# Patient Record
Sex: FEMALE | Employment: UNEMPLOYED | URBAN - METROPOLITAN AREA
[De-identification: names, ages, dates, MRNs, and addresses within clinical notes are randomized per-mention and may not be internally consistent; named-entity substitution may affect disease eponyms.]

---

## 2019-02-20 ENCOUNTER — OFFICE VISIT (OUTPATIENT)
Dept: OPTOMETRY | Facility: CLINIC | Age: 34
End: 2019-02-20
Payer: COMMERCIAL

## 2019-02-20 DIAGNOSIS — H52.13 MYOPIA OF BOTH EYES: ICD-10-CM

## 2019-02-20 DIAGNOSIS — Z01.00 ENCOUNTER FOR EXAMINATION OF EYES AND VISION WITHOUT ABNORMAL FINDINGS: Primary | ICD-10-CM

## 2019-02-20 DIAGNOSIS — Z46.0 CONTACT LENS/GLASSES FITTING: ICD-10-CM

## 2019-02-20 PROCEDURE — 92310 CONTACT LENS FITTING OU: CPT | Mod: GA | Performed by: OPTOMETRIST

## 2019-02-20 PROCEDURE — 92004 COMPRE OPH EXAM NEW PT 1/>: CPT | Performed by: OPTOMETRIST

## 2019-02-20 PROCEDURE — 92015 DETERMINE REFRACTIVE STATE: CPT | Performed by: OPTOMETRIST

## 2019-02-20 ASSESSMENT — REFRACTION_MANIFEST
OD_SPHERE: -1.50
OD_CYLINDER: SPHERE
OS_CYLINDER: SPHERE
OS_SPHERE: -1.75

## 2019-02-20 ASSESSMENT — VISUAL ACUITY
OD_CC+: -1
OS_CC: 20/25
METHOD: SNELLEN - LINEAR
OS_CC: 20/30 -1
CORRECTION_TYPE: CONTACTS
OD_CC: 20/40
OD_CC: 20/20

## 2019-02-20 ASSESSMENT — TONOMETRY
IOP_METHOD: APPLANATION
OS_IOP_MMHG: 21
OD_IOP_MMHG: 21

## 2019-02-20 ASSESSMENT — REFRACTION_CURRENTRX
OS_CYLINDER: SPHERE
OD_SPHERE: -1.50
OD_CYLINDER: SPHERE
OS_BRAND: COOPER BIOFINITY BC 8.6, D 14.0
OS_SPHERE: -1.75
OD_BRAND: COOPER BIOFINITY BC 8.6, D 14.0

## 2019-02-20 ASSESSMENT — EXTERNAL EXAM - RIGHT EYE: OD_EXAM: NORMAL

## 2019-02-20 ASSESSMENT — SLIT LAMP EXAM - LIDS
COMMENTS: NORMAL
COMMENTS: NORMAL

## 2019-02-20 ASSESSMENT — CUP TO DISC RATIO
OD_RATIO: 0.15
OS_RATIO: 0.15

## 2019-02-20 ASSESSMENT — CONF VISUAL FIELD
OD_NORMAL: 1
OS_NORMAL: 1

## 2019-02-20 ASSESSMENT — EXTERNAL EXAM - LEFT EYE: OS_EXAM: NORMAL

## 2019-02-20 NOTE — LETTER
2/20/2019         RE: Matthew Enciso  [address] [facility] [address] [place] [id number]        Dear Colleague,    Thank you for referring your patient, Matthew Enciso, to the [facility]. Please see a copy of my visit note below.    Chief Complaint   Patient presents with     Annual Eye Exam    Previous contact lens wearer? Yes  Comfort of contact lenses : not happy with the comfort, patient currently wears AV Oasys  Satisfied with current lenses: No    Accompanied by self  Last Eye Exam: 1 year ago  Dilated Previously: Yes    What are you currently using to see?  Contacts, patient doesn't have eyeglasses       Distance Vision Acuity: Satisfied with vision    Near Vision Acuity: Satisfied with vision while reading  unaided    Eye Comfort: good  Do you use eye drops? : No  Occupation or Hobbies: homemaker    Ct Lisa, Optometric Tech          Medical, surgical and family histories reviewed and updated 2/20/2019.       OBJECTIVE: See Ophthalmology exam    ASSESSMENT:    ICD-10-CM    1. Encounter for examination of eyes and vision without abnormal findings Z01.00 EYE EXAM (SIMPLE-NONBILLABLE)   2. Myopia of both eyes H52.13 EYE EXAM (SIMPLE-NONBILLABLE)     REFRACTION   3. Contact lens/glasses fitting Z46.0 EYE EXAM (SIMPLE-NONBILLABLE)     REFRACTION      PLAN:     Patient Instructions   Matthew was advised of today's exam findings.  Fill glasses prescription. Copy of glasses Rx provided today.    Patient is current wearer of Acuvue Oasys contact lenses, but reports comfort issues. Has previously worn Biofinity lenses and Air Optix, had issues with comfort of both lenses but Biofinity was better than Air Optix.     Begin trial of Biofinity contact lenses.  Do not sleep in contact lenses. Health risks discussed.  Do not swim in contact lenses . Health risks discussed.  Only wear contact lenses if they are comfortable and eyes are not red .  Use new contact solution in case every day.  Maximum contact  wearing time of 12 hours per day.    Return in  3-4 weeks for contact lens follow-up. Come to appointment wearing the contact lenses.     The affects of the dilating drops last for 4- 6 hours.  You will be more sensitive to light and vision will be blurry up close.  Mydriatic sunglasses were given if needed.      Kevin Lutz O.D.  [address]  [place]  [address]    [phone]           Again, thank you for allowing me to participate in the care of your patient.        Sincerely,        Kevin Lutz OD

## 2019-02-20 NOTE — PATIENT INSTRUCTIONS
Matthew was advised of today's exam findings.  Fill glasses prescription. Copy of glasses Rx provided today.    Patient is current wearer of Acuvue Oasys contact lenses, but reports comfort issues. Has previously worn Biofinity lenses and Air Optix, had issues with comfort of both lenses but Biofinity was better than Air Optix.     Begin trial of Biofinity contact lenses.  Do not sleep in contact lenses. Health risks discussed.  Do not swim in contact lenses . Health risks discussed.  Only wear contact lenses if they are comfortable and eyes are not red .  Use new contact solution in case every day.  Maximum contact wearing time of 12 hours per day.    Return in  3-4 weeks for contact lens follow-up. Come to appointment wearing the contact lenses.     The affects of the dilating drops last for 4- 6 hours.  You will be more sensitive to light and vision will be blurry up close.  Mydriatic sunglasses were given if needed.      Kevin Lutz O.D.  [facility]  [address] [place] [address]. [place]  [place]  [address]    [phone]

## 2019-02-20 NOTE — PROGRESS NOTES
Chief Complaint   Patient presents with     Annual Eye Exam    Previous contact lens wearer? Yes  Comfort of contact lenses : not happy with the comfort, patient currently wears AV Oasys  Satisfied with current lenses: No    Accompanied by self  Last Eye Exam: 1 year ago  Dilated Previously: Yes    What are you currently using to see?  Contacts, patient doesn't have eyeglasses       Distance Vision Acuity: Satisfied with vision    Near Vision Acuity: Satisfied with vision while reading  unaided    Eye Comfort: good  Do you use eye drops? : No  Occupation or Hobbies: homemaker    Ct Lisa, [employer]          Medical, surgical and family histories reviewed and updated 2/20/2019.       OBJECTIVE: See Ophthalmology exam    ASSESSMENT:    ICD-10-CM    1. Encounter for examination of eyes and vision without abnormal findings Z01.00 EYE EXAM (SIMPLE-NONBILLABLE)   2. Myopia of both eyes H52.13 EYE EXAM (SIMPLE-NONBILLABLE)     REFRACTION   3. Contact lens/glasses fitting Z46.0 EYE EXAM (SIMPLE-NONBILLABLE)     REFRACTION      PLAN:     Patient Instructions   [place] was advised of today's exam findings.  Fill glasses prescription. Copy of glasses Rx provided today.    Patient is current wearer of Acuvue Oasys contact lenses, but reports comfort issues. Has previously worn Biofinity lenses and Air Optix, had issues with comfort of both lenses but Biofinity was better than Air Optix.     Begin trial of Biofinity contact lenses.  Do not sleep in contact lenses. Health risks discussed.  Do not swim in contact lenses . Health risks discussed.  Only wear contact lenses if they are comfortable and eyes are not red .  Use new contact solution in case every day.  Maximum contact wearing time of 12 hours per day.    Return in  3-4 weeks for contact lens follow-up. Come to appointment wearing the contact lenses.     The affects of the dilating drops last for 4- 6 hours.  You will be more sensitive to light and vision will  be blurry up close.  Mydriatic sunglasses were given if needed.      Kevin Lutz O.D.  [address]  [place]  [phone]

## 2019-03-21 ENCOUNTER — OFFICE VISIT (OUTPATIENT)
Dept: OPTOMETRY | Facility: CLINIC | Age: 34
End: 2019-03-21

## 2019-03-21 DIAGNOSIS — Z46.0 CONTACT LENS/GLASSES FITTING: Primary | ICD-10-CM

## 2019-03-21 DIAGNOSIS — H52.13 MYOPIA OF BOTH EYES: ICD-10-CM

## 2019-03-21 PROCEDURE — 99207 ZZC NO BILLABLE SERVICE THIS VISIT: CPT | Performed by: OPTOMETRIST

## 2019-03-21 ASSESSMENT — REFRACTION_CURRENTRX
OD_SPHERE: -1.50
OS_BRAND: COOPER BIOFINITY BC 8.6, D 14.0
OD_BRAND: COOPER BIOFINITY BC 8.6, D 14.0
OD_CYLINDER: SPHERE
OS_CYLINDER: SPHERE
OS_SPHERE: -1.75

## 2019-03-21 ASSESSMENT — VISUAL ACUITY
METHOD: SNELLEN - LINEAR
OS_CC: 20/25
OD_CC: 20/20
CORRECTION_TYPE: CONTACTS

## 2019-03-21 ASSESSMENT — EXTERNAL EXAM - LEFT EYE: OS_EXAM: NORMAL

## 2019-03-21 ASSESSMENT — SLIT LAMP EXAM - LIDS
COMMENTS: NORMAL
COMMENTS: NORMAL

## 2019-03-21 ASSESSMENT — EXTERNAL EXAM - RIGHT EYE: OD_EXAM: NORMAL

## 2019-03-21 NOTE — PATIENT INSTRUCTIONS
Adequate fit/comfort/vision with Biofinity contact lenses.     Do not sleep in contact lenses. Health risks discussed.  Do not swim in contact lenses . Health risks discussed.  Replace contacts every month.  Maximum contact wearing time of 12 hours per day.  Contact lenses prescription released to patient today  Copy of glasses Rx provided today.    Return in 1 year for eye exam, or sooner if needed.      Kevin Lutz O.D.  [address]  [place]  [address]    [phone]

## 2019-03-21 NOTE — LETTER
3/21/2019         RE: Matthew Enciso  [address] [facility] [address] [facility] [address]  [facility] [address]        Dear Colleague,    Thank you for referring your patient, Matthew Enciso, to the [facility]. Please see a copy of my visit note below.    Chief Complaint   Patient presents with     Contact Lens Follow Up     Satisfied with contacts:  Yes    Good comfort:  Yes  Clear vision:     Yes    Ct Lisa, Optometric Tech          Medical, surgical and family histories reviewed and updated 3/21/2019.       OBJECTIVE: See Ophthalmology exam    ASSESSMENT:    ICD-10-CM    1. Contact lens/glasses fitting Z46.0    2. Myopia of both eyes H52.13       PLAN:    Patient Instructions   Adequate fit/comfort/vision with Biofinity contact lenses.     Do not sleep in contact lenses. Health risks discussed.  Do not swim in contact lenses . Health risks discussed.  Replace contacts every month.  Maximum contact wearing time of 12 hours per day.  Contact lenses prescription released to patient today  Copy of glasses Rx provided today.    Return in 1 year for eye exam, or sooner if needed.      Kevin Lutz O.D.  [facility]  [address] [place] [address]. [place]  [address]    [phone]                       Again, thank you for allowing me to participate in the care of your patient.        Sincerely,        Kevin Lutz OD

## 2019-03-21 NOTE — PROGRESS NOTES
Chief Complaint   Patient presents with     Contact Lens Follow Up     Satisfied with contacts:  Yes    Good comfort:  Yes  Clear vision:     Yes    Ct Lisa, Optometric Tech          Medical, surgical and family histories reviewed and updated 3/21/2019.       OBJECTIVE: See Ophthalmology exam    ASSESSMENT:    ICD-10-CM    1. Contact lens/glasses fitting Z46.0    2. Myopia of both eyes H52.13       PLAN:    Patient Instructions   Adequate fit/comfort/vision with Biofinity contact lenses.     Do not sleep in contact lenses. Health risks discussed.  Do not swim in contact lenses . Health risks discussed.  Replace contacts every month.  Maximum contact wearing time of 12 hours per day.  Contact lenses prescription released to patient today  Copy of glasses Rx provided today.    Return in 1 year for eye exam, or sooner if needed.      Kevin Lutz O.D.  [address] [place]  [address]    [phone]

## 2020-09-09 ENCOUNTER — TELEPHONE (OUTPATIENT)
Dept: OPTOMETRY | Facility: CLINIC | Age: 35
End: 2020-09-09

## 2020-09-09 NOTE — TELEPHONE ENCOUNTER
Patient states contacts are not lasting a full month.  Contacts get dry and uncomfortable by beginning of 3rd week - and unbearable by end of the 3rd week.  Patient states she does not sleep in contacts. Patient is using biotrue solution.    Patient states she is just using contacts and has no glasses.    Patient states she does not have seasonal allergies.

## 2020-09-09 NOTE — TELEPHONE ENCOUNTER
Patient is wanting to switch brands of contacts. Please return phone call to discuss. [phone].

## 2020-09-15 ENCOUNTER — OFFICE VISIT (OUTPATIENT)
Dept: OPTOMETRY | Facility: CLINIC | Age: 35
End: 2020-09-15

## 2020-09-15 DIAGNOSIS — H52.13 MYOPIA OF BOTH EYES: ICD-10-CM

## 2020-09-15 DIAGNOSIS — Z46.0 CONTACT LENS/GLASSES FITTING: Primary | ICD-10-CM

## 2020-09-15 PROCEDURE — 99207 ZZC NO CHARGE LOS: CPT | Performed by: OPTOMETRIST

## 2020-09-15 ASSESSMENT — REFRACTION_CURRENTRX
OS_BRAND: J&J 1-DAY ACUVUE MOIST BC 8.5, D 14.2
OS_CYLINDER: SPHERE
OS_SPHERE: -1.75
OD_BRAND: J&J 1-DAY ACUVUE MOIST BC 8.5, D 14.2
OD_SPHERE: -1.50
OD_CYLINDER: SPHERE

## 2020-09-15 ASSESSMENT — EXTERNAL EXAM - RIGHT EYE: OD_EXAM: NORMAL

## 2020-09-15 ASSESSMENT — SLIT LAMP EXAM - LIDS
COMMENTS: NORMAL
COMMENTS: NORMAL

## 2020-09-15 ASSESSMENT — EXTERNAL EXAM - LEFT EYE: OS_EXAM: NORMAL

## 2020-09-15 ASSESSMENT — VISUAL ACUITY: METHOD: SNELLEN - LINEAR

## 2020-09-15 NOTE — PATIENT INSTRUCTIONS
Begin trial of 1-Day Acuvue Moist contact lenses.   Maximum wear time of 12 hours/day.   Wear the lenses for 1 day then dispose of the lenses.   If adequate comfort/vision with contact lenses, we can finalize the prescription. If not, return for contact lens follow-up appointment.       Kevin Lutz OD  [address] [place]  [address]    [phone]       Patient's mother will need to request that previous MD send us records or she can sign a release when she is here with patient during appt.

## 2020-09-15 NOTE — LETTER
9/15/2020         RE: Matthew Enciso  [address] [facility] [address]  [place] [address]        Dear Colleague,    Thank you for referring your patient, Matthew Enciso, to the [facility]. Please see a copy of my visit note below.    Chief Complaint   Patient presents with     Contact Lens Problem Both Eyes     Eye Medications/drops: None    Patient states contacts are not lasting a full month (Biofinity).  Contacts get dry and uncomfortable by beginning of 3rd week - and unbearable by end of the 3rd week.  Patient states she does not sleep in contacts. Patient is using biotrue solution.  Patient would like to try a daily lens.     Patient states she is just using contacts and has no glasses.     Patient states she does not have seasonal allergies.      Medical, surgical and family histories reviewed and updated 9/15/2020.       OBJECTIVE: See Ophthalmology exam    ASSESSMENT:    ICD-10-CM    1. Contact lens/glasses fitting  Z46.0    2. Myopia of both eyes  H52.13       PLAN:     Patient Instructions   Begin trial of 1-Day Acuvue Moist contact lenses.   Maximum wear time of 12 hours/day.   Wear the lenses for 1 day then dispose of the lenses.   If adequate comfort/vision with contact lenses, we can finalize the prescription. If not, return for contact lens follow-up appointment.       Kevin Lutz OD  [facility]  [address] [place] [address]. [place]  [address] [phone]             Again, thank you for allowing me to participate in the care of your patient.        Sincerely,        Kevin Lutz OD

## 2020-09-15 NOTE — PROGRESS NOTES
Chief Complaint   Patient presents with     Contact Lens Problem Both Eyes     Eye Medications/drops: None    Patient states contacts are not lasting a full month (Biofinity).  Contacts get dry and uncomfortable by beginning of 3rd week - and unbearable by end of the 3rd week.  Patient states she does not sleep in contacts. Patient is using biotrue solution.  Patient would like to try a daily lens.     Patient states she is just using contacts and has no glasses.     Patient states she does not have seasonal allergies.      Medical, surgical and family histories reviewed and updated 9/15/2020.       OBJECTIVE: See Ophthalmology exam    ASSESSMENT:    ICD-10-CM    1. Contact lens/glasses fitting  Z46.0    2. Myopia of both eyes  H52.13       PLAN:     Patient Instructions   Begin trial of 1-Day Acuvue Moist contact lenses.   Maximum wear time of 12 hours/day.   Wear the lenses for 1 day then dispose of the lenses.   If adequate comfort/vision with contact lenses, we can finalize the prescription. If not, return for contact lens follow-up appointment.       Kevin Lutz OD  [facility] [place]  [address] [place] [address]. [place]  [address]    [phone]

## 2023-11-08 ENCOUNTER — OFFICE VISIT (OUTPATIENT)
Dept: OPTOMETRY | Facility: CLINIC | Age: 38
End: 2023-11-08
Payer: COMMERCIAL

## 2023-11-08 DIAGNOSIS — Z01.00 ENCOUNTER FOR EXAMINATION OF EYES AND VISION WITHOUT ABNORMAL FINDINGS: Primary | ICD-10-CM

## 2023-11-08 DIAGNOSIS — Z46.0 CONTACT LENS/GLASSES FITTING: ICD-10-CM

## 2023-11-08 DIAGNOSIS — H52.13 MYOPIA OF BOTH EYES: ICD-10-CM

## 2023-11-08 PROCEDURE — 92015 DETERMINE REFRACTIVE STATE: CPT | Performed by: OPTOMETRIST

## 2023-11-08 PROCEDURE — 92310 CONTACT LENS FITTING OU: CPT | Performed by: OPTOMETRIST

## 2023-11-08 PROCEDURE — 92004 COMPRE OPH EXAM NEW PT 1/>: CPT | Performed by: OPTOMETRIST

## 2023-11-08 ASSESSMENT — CONF VISUAL FIELD
OS_INFERIOR_TEMPORAL_RESTRICTION: 0
OS_NORMAL: 1
OD_INFERIOR_NASAL_RESTRICTION: 0
OD_INFERIOR_TEMPORAL_RESTRICTION: 0
METHOD: COUNTING FINGERS
OD_SUPERIOR_TEMPORAL_RESTRICTION: 0
OD_NORMAL: 1
OS_SUPERIOR_TEMPORAL_RESTRICTION: 0
OS_SUPERIOR_NASAL_RESTRICTION: 0
OD_SUPERIOR_NASAL_RESTRICTION: 0
OS_INFERIOR_NASAL_RESTRICTION: 0

## 2023-11-08 ASSESSMENT — REFRACTION_CURRENTRX
OS_SPHERE: -1.75
OD_BRAND: J&J 1-DAY ACUVUE MOIST BC 8.5, D 14.2
OD_SPHERE: -1.50
OS_BRAND: J&J 1-DAY ACUVUE MOIST BC 8.5, D 14.2

## 2023-11-08 ASSESSMENT — VISUAL ACUITY
METHOD: SNELLEN - LINEAR
OS_CC+: -2
OD_CC: 20/20-1
OD_CC: 20/20
OD_CC+: -1
CORRECTION_TYPE: CONTACTS
OS_CC: 20/25
OS_CC: 20/20-1

## 2023-11-08 ASSESSMENT — REFRACTION_MANIFEST
OD_SPHERE: -1.50
OS_SPHERE: -2.25
OD_CYLINDER: SPHERE
OS_CYLINDER: SPHERE

## 2023-11-08 ASSESSMENT — SLIT LAMP EXAM - LIDS
COMMENTS: NORMAL
COMMENTS: NORMAL

## 2023-11-08 ASSESSMENT — EXTERNAL EXAM - LEFT EYE: OS_EXAM: NORMAL

## 2023-11-08 ASSESSMENT — TONOMETRY
IOP_METHOD: APPLANATION
OS_IOP_MMHG: 19
OD_IOP_MMHG: 17

## 2023-11-08 ASSESSMENT — CUP TO DISC RATIO
OD_RATIO: 0.15
OS_RATIO: 0.15

## 2023-11-08 ASSESSMENT — EXTERNAL EXAM - RIGHT EYE: OD_EXAM: NORMAL

## 2023-11-08 NOTE — LETTER
11/8/2023         RE: Matthew Enciso  [address] [facility] [address]        Dear Colleague,    Thank you for referring your patient, Matthew Enciso, to the [facility]. Please see a copy of my visit note below.    Chief Complaint   Patient presents with     Annual Eye Exam     New Eval For Contact Lens        Previous contact lens wearer? Yes: Acuvue 1-Day Moist sph each eye   Comfort of contact lenses : Good  Satisfied with current lenses: Yes - would like to update CL Rx and stick with same brand    -OK with $75 fitting fee    Last Eye Exam: 2/20/2019  Dilated Previously: Yes, side effects of dilation explained today    What are you currently using to see?  Contacts - wears full time, does not have a pair of glasses     Distance Vision Acuity: Satisfied with vision    Near Vision Acuity: Satisfied with vision while reading and using computer with contacts    Eye Comfort: good  Do you use eye drops? 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       OBJECTIVE: See Ophthalmology exam    ASSESSMENT:    ICD-10-CM    1. Encounter for examination of eyes and vision without abnormal findings  Z01.00 EYE EXAM (SIMPLE-NONBILLABLE)      2. Myopia of both eyes  H52.13 EYE EXAM (SIMPLE-NONBILLABLE)     REFRACTION     CONTACT LENS FITTING,BILAT w/ signed waiver      3. Contact lens/glasses fitting  Z46.0 EYE EXAM (SIMPLE-NONBILLABLE)     CONTACT LENS FITTING,BILAT w/ signed waiver         PLAN:     Patient Instructions   Updated glasses and contact lens prescriptions provided today.   Fill glasses prescription.     Maximum wear-time of 12 hours/day of the contact lenses.   No sleeping or swimming in the contact lenses.   Dispose of the lenses after one day of wear.     Return in 1 year for a comprehensive eye exam, or sooner if needed.      The effects of the dilating drops last  for 4- 6 hours.  You will be more sensitive to light and vision will be blurry up close.  Mydriatic sunglasses were given if needed.     Kevin Lutz, JUNG  [address]. [place]  [address]    [phone]                 Again, thank you for allowing me to participate in the care of your patient.        Sincerely,        Kevin Lutz OD

## 2023-11-08 NOTE — PROGRESS NOTES
Chief Complaint   Patient presents with    Annual Eye Exam    New Eval For Contact Lens        Previous contact lens wearer? Yes: Acuvue 1-Day Moist sph each eye   Comfort of contact lenses : Good  Satisfied with current lenses: Yes - would like to update CL Rx and stick with same brand    -OK with $75 fitting fee    Last Eye Exam: 2/20/2019  Dilated Previously: Yes, side effects of dilation explained today    What are you currently using to see?  Contacts - wears full time, does not have a pair of glasses     Distance Vision Acuity: Satisfied with vision    Near Vision Acuity: Satisfied with vision while reading and using computer with contacts    Eye Comfort: good  Do you use eye drops? 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       OBJECTIVE: See Ophthalmology exam    ASSESSMENT:    ICD-10-CM    1. Encounter for examination of eyes and vision without abnormal findings  Z01.00 EYE EXAM (SIMPLE-NONBILLABLE)      2. Myopia of both eyes  H52.13 EYE EXAM (SIMPLE-NONBILLABLE)     REFRACTION     CONTACT LENS FITTING,BILAT w/ signed waiver      3. Contact lens/glasses fitting  Z46.0 EYE EXAM (SIMPLE-NONBILLABLE)     CONTACT LENS FITTING,BILAT w/ signed waiver         PLAN:     Patient Instructions   Updated glasses and contact lens prescriptions provided today.   Fill glasses prescription.     Maximum wear-time of 12 hours/day of the contact lenses.   No sleeping or swimming in the contact lenses.   Dispose of the lenses after one day of wear.     Return in 1 year for a comprehensive eye exam, or sooner if needed.      The effects of the dilating drops last for 4- 6 hours.  You will be more sensitive to light and vision will be blurry up close.  Mydriatic sunglasses were given if needed.     Kevin Lutz, OD  [facility]  [address] [place] [address]. [place]  [address] [phone]

## 2023-11-08 NOTE — PATIENT INSTRUCTIONS
Updated glasses and contact lens prescriptions provided today.   Fill glasses prescription.     Maximum wear-time of 12 hours/day of the contact lenses.   No sleeping or swimming in the contact lenses.   Dispose of the lenses after one day of wear.     Return in 1 year for a comprehensive eye exam, or sooner if needed.      The effects of the dilating drops last for 4- 6 hours.  You will be more sensitive to light and vision will be blurry up close.  Mydriatic sunglasses were given if needed.     Kevin Lutz, JUNG  [address] [place]  [address]    [phone]

## 2024-11-04 ENCOUNTER — HOSPITAL ENCOUNTER (EMERGENCY)
Facility: CLINIC | Age: 39
Discharge: HOME | End: 2024-11-04
Attending: PHYSICIAN ASSISTANT | Admitting: PHYSICIAN ASSISTANT
Payer: COMMERCIAL

## 2024-11-04 ENCOUNTER — APPOINTMENT (OUTPATIENT)
Dept: GENERAL RADIOLOGY | Facility: CLINIC | Age: 39
End: 2024-11-04
Attending: PHYSICIAN ASSISTANT
Payer: COMMERCIAL

## 2024-11-04 VITALS
DIASTOLIC BLOOD PRESSURE: 98 MMHG | HEART RATE: 82 BPM | TEMPERATURE: 98.2 F | RESPIRATION RATE: 18 BRPM | SYSTOLIC BLOOD PRESSURE: 152 MMHG | OXYGEN SATURATION: 99 %

## 2024-11-04 DIAGNOSIS — R03.0 ELEVATED BLOOD PRESSURE READING: ICD-10-CM

## 2024-11-04 DIAGNOSIS — M54.41 ACUTE RIGHT-SIDED LOW BACK PAIN WITH RIGHT-SIDED SCIATICA: ICD-10-CM

## 2024-11-04 PROBLEM — Z87.59 HISTORY OF PRE-ECLAMPSIA: Status: ACTIVE | Noted: 2019-10-16

## 2024-11-04 PROBLEM — I10 ESSENTIAL HYPERTENSION: Status: ACTIVE | Noted: 2022-05-16

## 2024-11-04 PROBLEM — N02.B9 IGA NEPHROPATHY: Status: ACTIVE | Noted: 2018-05-02

## 2024-11-04 LAB — HCG SERPL QL: NEGATIVE

## 2024-11-04 PROCEDURE — 84703 CHORIONIC GONADOTROPIN ASSAY: CPT | Performed by: PHYSICIAN ASSISTANT

## 2024-11-04 PROCEDURE — 250N000013 HC RX MED GY IP 250 OP 250 PS 637: Performed by: PHYSICIAN ASSISTANT

## 2024-11-04 PROCEDURE — 36415 COLL VENOUS BLD VENIPUNCTURE: CPT | Performed by: PHYSICIAN ASSISTANT

## 2024-11-04 PROCEDURE — 73502 X-RAY EXAM HIP UNI 2-3 VIEWS: CPT

## 2024-11-04 PROCEDURE — 250N000011 HC RX IP 250 OP 636: Performed by: PHYSICIAN ASSISTANT

## 2024-11-04 PROCEDURE — 99284 EMERGENCY DEPT VISIT MOD MDM: CPT | Mod: 25

## 2024-11-04 PROCEDURE — 96372 THER/PROPH/DIAG INJ SC/IM: CPT | Performed by: PHYSICIAN ASSISTANT

## 2024-11-04 RX ORDER — METHOCARBAMOL 500 MG/1
500 TABLET, FILM COATED ORAL ONCE
Status: COMPLETED | OUTPATIENT
Start: 2024-11-04 | End: 2024-11-04

## 2024-11-04 RX ORDER — ACETAMINOPHEN 500 MG
1000 TABLET ORAL EVERY 6 HOURS PRN
Qty: 30 TABLET | Refills: 0 | Status: SHIPPED | OUTPATIENT
Start: 2024-11-04 | End: 2024-11-11

## 2024-11-04 RX ORDER — KETOROLAC TROMETHAMINE 15 MG/ML
15 INJECTION, SOLUTION INTRAMUSCULAR; INTRAVENOUS ONCE
Status: COMPLETED | OUTPATIENT
Start: 2024-11-04 | End: 2024-11-04

## 2024-11-04 RX ORDER — OXYCODONE HYDROCHLORIDE 5 MG/1
5 TABLET ORAL EVERY 6 HOURS PRN
Qty: 10 TABLET | Refills: 0 | Status: SHIPPED | OUTPATIENT
Start: 2024-11-04

## 2024-11-04 RX ORDER — METHYLPREDNISOLONE 4 MG/1
TABLET ORAL
Qty: 21 TABLET | Refills: 0 | Status: SHIPPED | OUTPATIENT
Start: 2024-11-04

## 2024-11-04 RX ORDER — METHOCARBAMOL 500 MG/1
500 TABLET, FILM COATED ORAL 4 TIMES DAILY PRN
Qty: 20 TABLET | Refills: 0 | Status: SHIPPED | OUTPATIENT
Start: 2024-11-04

## 2024-11-04 RX ORDER — OXYCODONE HYDROCHLORIDE 5 MG/1
10 TABLET ORAL ONCE
Status: COMPLETED | OUTPATIENT
Start: 2024-11-04 | End: 2024-11-04

## 2024-11-04 RX ADMIN — OXYCODONE HYDROCHLORIDE 10 MG: 5 TABLET ORAL at 12:34

## 2024-11-04 RX ADMIN — KETOROLAC TROMETHAMINE 15 MG: 15 INJECTION, SOLUTION INTRAMUSCULAR; INTRAVENOUS at 13:21

## 2024-11-04 RX ADMIN — METHOCARBAMOL 500 MG: 500 TABLET ORAL at 12:35

## 2024-11-04 ASSESSMENT — ACTIVITIES OF DAILY LIVING (ADL)
ADLS_ACUITY_SCORE: 0

## 2024-11-04 ASSESSMENT — COLUMBIA-SUICIDE SEVERITY RATING SCALE - C-SSRS
2. HAVE YOU ACTUALLY HAD ANY THOUGHTS OF KILLING YOURSELF IN THE PAST MONTH?: NO
1. IN THE PAST MONTH, HAVE YOU WISHED YOU WERE DEAD OR WISHED YOU COULD GO TO SLEEP AND NOT WAKE UP?: NO
6. HAVE YOU EVER DONE ANYTHING, STARTED TO DO ANYTHING, OR PREPARED TO DO ANYTHING TO END YOUR LIFE?: NO

## 2024-11-04 NOTE — ED PROVIDER NOTES
Emergency Department Note      History of Present Illness     Chief Complaint   Back Pain      HPI   Matthew Enciso is a 39 year old female with a history of hypertension who presents to the ED via EMS for evaluation of back pain. The patient states she started to develop right-sided low back and hip pain about 2 months ago that has been gradually worsening with time and became significant yesterday. States the pain now intermittently shoots down her right leg, radiates to her left side, and is worse with standing or walking. She has been unable to ambulate this morning due to the pain. She attempted to treat her pain with Tylenol and ibuprofen this morning with no relief. She has been seeing a chiropractor for this issue without relief. She was seen by her PCP with pain onset and an x-ray was done which was unremarkable. Denies recent falls, heavy lifting, injury, trauma, or prior surgeries. Denies bowel or bladder incontinence, saddle anesthesia, dysuria, hematuria, or fever. No concern for pregnancy.  Patient has been able to ambulate with assistance to th bathroom with her .    XR Sacroiliac Joint G/E 3 Views  Order: [id number]  Impression    COMPARISON:  CT abdomen and pelvis, 07/06/2024.    FINDINGS:  3 views of the sacroiliac joints. Sclerosis about the SI  joints. No bone erosion or ankylosis. Joint spaces of the hips are  preserved. Visualized lower lumbar spine is unremarkable.  Exam End: 09/04/24  3:26 PM    Specimen Collected: 09/04/24  3:19 PM Last Resulted: 09/04/24  3:34 PM   Received From: [employer]  Result Received: 11/04/24 10:55 AM       Independent Historian   None    Review of External Notes     Past Medical History     Medical History and Problem List   HTN  Preeclampsia  IgA nephropathy      Medications   Flexeril  Losartan     Physical Exam     Patient Vitals for the past 24 hrs:   BP Temp Temp src Pulse Resp SpO2   11/04/24 1413 (!) 152/98 -- -- 82 18 99 %   11/04/24 1100 (!)  164/95 98.2  F (36.8  C) Oral 63 20 99 %     Physical Exam  Vitals and nursing note reviewed.   Constitutional:       Appearance: She is not diaphoretic.   HENT:      Mouth/Throat:      Mouth: Mucous membranes are moist.      Pharynx: Oropharynx is clear.   Eyes:      General: No scleral icterus.     Extraocular Movements: Extraocular movements intact.      Conjunctiva/sclera: Conjunctivae normal.   Cardiovascular:      Rate and Rhythm: Normal rate and regular rhythm.      Pulses:           Dorsalis pedis pulses are 2+ on the right side and 2+ on the left side.      Heart sounds: Normal heart sounds, S1 normal and S2 normal. No murmur heard.     No friction rub. No gallop.   Pulmonary:      Effort: Pulmonary effort is normal. No respiratory distress.      Breath sounds: Normal breath sounds. No stridor. No wheezing, rhonchi or rales.   Abdominal:      General: There is no distension.      Palpations: Abdomen is soft.      Tenderness: There is no abdominal tenderness. There is no guarding or rebound.   Musculoskeletal:      Thoracic back: No swelling, deformity, tenderness or bony tenderness. Normal range of motion.      Lumbar back: Tenderness present. No swelling, deformity or bony tenderness. Decreased range of motion. Positive right straight leg raise test.        Back:       Right lower leg: No edema.      Left lower leg: No edema.      Comments: Pain with palpation over right sciatic notch   Skin:     Coloration: Skin is not pale.      Findings: No bruising.   Neurological:      Mental Status: She is alert and oriented to person, place, and time. Mental status is at baseline.      Cranial Nerves: No cranial nerve deficit, dysarthria or facial asymmetry.      Sensory: Sensation is intact. No sensory deficit.      Motor: Motor function is intact. No weakness.      Comments: Myotomes L1-S1, C5-T1 intact bilaterally 5-5 strength   Psychiatric:         Mood and Affect: Mood normal.         Behavior: Behavior normal.          Thought Content: Thought content normal.         Diagnostics     Lab Results   Labs Ordered and Resulted from Time of ED Arrival to Time of ED Departure   HCG QUALITATIVE PREGNANCY - Normal       Result Value    hCG Serum Qualitative Negative         Imaging   XR Pelvis w Hip Right 1 View   Final Result   IMPRESSION: No fracture. Widening of the pubic symphysis may be   positional. Correlate with clinical symptoms. Right hip joint space is   normal.      KJ HASKINS MD            SYSTEM ID:  [id number]        Independent Interpretation   Right Hip Xray: No fracture or dislocation.    ED Course      Medications Administered   Medications   oxyCODONE (ROXICODONE) tablet 10 mg (10 mg Oral $Given 11/4/24 1234)   methocarbamol (ROBAXIN) tablet 500 mg (500 mg Oral $Given 11/4/24 1235)   ketorolac (TORADOL) injection 15 mg (15 mg Intramuscular $Given 11/4/24 1321)       Procedures   Procedures     Discussion of Management   None    ED Course   ED Course as of 11/04/24 1427   Mon Nov 04, 2024   1125 I obtained history and performed a physical exam as noted above.    1405 Patient reports pain is better       Additional Documentation  None    Medical Decision Making / Diagnosis     CMS Diagnoses: None    MIPS       None    MDM     Matthew Enciso is a 39 year old female who presents for evaluation of back pain.  She has  history of back pain and sciatica in the past.  Pain has improved with interventions in the emergency department. Xray imaging of her hip was negative. No red flag symptoms to suggest CT and/or MRI is indicated at this point.  There is no clinical evidence of cauda equina syndrome, discitis, spinal/epidural space hematoma or epidural abscess or other worrisome etiology. The neurological exam is normal and the patient's symptoms seem consistent with sciatic.  The patient will be discharged with pain medications to use as directed. Ice or heat to the back and stretching exercises. No heavy lifting,  bending or twisting. Return if increasing pain, numbness, weakness, or bowel or bladder dysfunction. She was advised to schedule follow-up with her primary doctor within 3 days to re-assess symptoms.          Disposition   The patient was discharged.     Diagnosis     ICD-10-CM    1. Acute right-sided low back pain with right-sided sciatica  M54.41       2. Elevated blood pressure reading  R03.0            Discharge Medications   Discharge Medication List as of 11/4/2024  2:05 PM        START taking these medications    Details   acetaminophen (TYLENOL) 500 MG tablet Take 2 tablets (1,000 mg) by mouth every 6 hours as needed for pain or fever., Disp-30 tablet, R-0, Local Print      methocarbamol (ROBAXIN) 500 MG tablet Take 1 tablet (500 mg) by mouth 4 times daily as needed for muscle spasms., Disp-20 tablet, R-0, Local Print      methylPREDNISolone (MEDROL DOSEPAK) 4 MG tablet therapy pack Follow Package Directions, Disp-21 tablet, R-0, Local Print      oxyCODONE (ROXICODONE) 5 MG tablet Take 1 tablet (5 mg) by mouth every 6 hours as needed for pain., Disp-10 tablet, R-0, Local Print               Scribe Disclosure:  I, Gudelia Alfonso, am serving as a scribe at 11:07 AM on 11/4/2024 to document services personally performed by Orlando Albrecht PA-C based on my observations and the provider's statements to me.        Orlando Albrecht PA-C  11/04/24 [id number]

## 2024-11-04 NOTE — ED TRIAGE NOTES
Pt arrives via ems from home. Pt has had back pain for x2 months. This morning was unable to get up and walk because of pain. Has taken tylenol/ibuprofen pta w no relief. Rates pain 7/10 on R side radiating to r hip. Denies numbness/tingling. VSS      Triage Assessment (Adult)       Row Name 11/04/24 1105          Triage Assessment    Airway WDL WDL        Respiratory WDL    Respiratory WDL WDL        Cardiac WDL    Cardiac WDL WDL        Peripheral/Neurovascular WDL    Peripheral Neurovascular WDL WDL